# Patient Record
Sex: MALE | Race: WHITE | NOT HISPANIC OR LATINO | Employment: OTHER | URBAN - METROPOLITAN AREA
[De-identification: names, ages, dates, MRNs, and addresses within clinical notes are randomized per-mention and may not be internally consistent; named-entity substitution may affect disease eponyms.]

---

## 2023-03-08 ENCOUNTER — TELEPHONE (OUTPATIENT)
Dept: PRIMARY CARE | Facility: CLINIC | Age: 68
End: 2023-03-08
Payer: MEDICARE

## 2023-06-25 DIAGNOSIS — I10 PRIMARY HYPERTENSION: ICD-10-CM

## 2023-06-26 RX ORDER — AMLODIPINE BESYLATE 10 MG/1
10 TABLET ORAL DAILY
Qty: 90 TABLET | Refills: 1 | Status: SHIPPED | OUTPATIENT
Start: 2023-06-26 | End: 2023-12-17

## 2023-07-03 ENCOUNTER — HOSPITAL ENCOUNTER (OUTPATIENT)
Dept: DATA CONVERSION | Facility: HOSPITAL | Age: 68
End: 2023-07-03
Attending: SURGERY | Admitting: SURGERY
Payer: MEDICARE

## 2023-07-03 DIAGNOSIS — K40.90 UNILATERAL INGUINAL HERNIA, WITHOUT OBSTRUCTION OR GANGRENE, NOT SPECIFIED AS RECURRENT: ICD-10-CM

## 2023-07-03 DIAGNOSIS — Z87.891 PERSONAL HISTORY OF NICOTINE DEPENDENCE: ICD-10-CM

## 2023-07-27 ENCOUNTER — TELEPHONE (OUTPATIENT)
Dept: PRIMARY CARE | Facility: CLINIC | Age: 68
End: 2023-07-27
Payer: MEDICARE

## 2023-07-27 NOTE — TELEPHONE ENCOUNTER
Patient say he and his wife was referred to a Iain kapoor from Dr Abimael Lafleur to get updated vaccines for out of country travel. He want to know is iain a good person to go

## 2023-09-12 ENCOUNTER — OFFICE VISIT (OUTPATIENT)
Dept: PRIMARY CARE | Facility: CLINIC | Age: 68
End: 2023-09-12
Payer: MEDICARE

## 2023-09-12 VITALS — HEART RATE: 97 BPM | OXYGEN SATURATION: 96 %

## 2023-09-12 DIAGNOSIS — R05.3 CHRONIC COUGH: Primary | ICD-10-CM

## 2023-09-12 PROCEDURE — 99214 OFFICE O/P EST MOD 30 MIN: CPT | Performed by: INTERNAL MEDICINE

## 2023-09-12 PROCEDURE — 1126F AMNT PAIN NOTED NONE PRSNT: CPT | Performed by: INTERNAL MEDICINE

## 2023-09-12 RX ORDER — AMOXICILLIN AND CLAVULANATE POTASSIUM 875; 125 MG/1; MG/1
875 TABLET, FILM COATED ORAL 2 TIMES DAILY
Qty: 14 TABLET | Refills: 0 | Status: SHIPPED | OUTPATIENT
Start: 2023-09-12 | End: 2023-09-19

## 2023-09-12 RX ORDER — PREDNISONE 20 MG/1
TABLET ORAL
Qty: 14 TABLET | Refills: 0 | Status: SHIPPED | OUTPATIENT
Start: 2023-09-12 | End: 2023-12-19 | Stop reason: ALTCHOICE

## 2023-09-12 RX ORDER — FLUTICASONE PROPIONATE AND SALMETEROL 500; 50 UG/1; UG/1
POWDER RESPIRATORY (INHALATION)
Qty: 60 EACH | Refills: 11 | Status: SHIPPED | OUTPATIENT
Start: 2023-09-12

## 2023-09-12 NOTE — PROGRESS NOTES
"Eric Alexander is a 67 yo M presenting for sick visit.     *Around 8-12-23 developed URI sxs with cough, dyspnea. Tried OTC meds like Mucinex & Robitussin. +Assoc cervcalgia which was pronounced. Seen in Urgent Care 8.23.23 given doxy and pred x5 days without benefit excpet for the 5 days duration. Notes persistent low energy, shortness of breath, +cough +sputum       *Foot pain, left foot, x2-3 days. After twisting injury. +mild lateral aspect swelling and lateral aspect pain. No erythema or warmth. No malleolar TTP. Able to bear weight.     Chronic PL:   1. DLD - atorva started 12.22   2. H/o vertigo s/p MRI.MRA 4.2021   3. HTN - amlo 10, lisin 10 started 12.22  4. Inguinal hernia s/p repair  5. PreDM 6.0 in 1.23   6. PSA 4.1 in 1.23   7. Lumbar radic s/p remote spine surg    #HM   -repeat labs incl PSA   -AAA screen 2022   -cscope 12.2019   -tdap 2015   -shingrix x2   -pneumovax UTD         70\"   215 lb      Gen alert non toxic appearing   HENT mmm pharynx clear   CV rrr   Pulm diffuse expiratory wheeze, no adventitious sounds      A/P   Eric presents with likely persistent RAD post viral upper respiratory infection vs CAP.     Cough - obtain CXR r/o CAP. Start Augmentin (add azithrom if infiltrate). Repeat prednisone, 40 mg x 7 days. Start LABA-ICS inhaler and continue until cough fully resolved.   Foot pain - rec'ed supportive care. No imaging today given ability to bear weight and no malleolar TTP. Call back if worsens     RTO 11.2023 for CPE. Repeat PSA at that time along with routine labs  "

## 2023-09-12 NOTE — PATIENT INSTRUCTIONS
Eric,     Take Augmentin (antibiotic) 1 tab twice a day for 7 days.   Take prednisone 20 mg 2 tabs every morning for 7 days.   Start fluticasone-salmeterol inhaler 1 puff twice a day EVERY day until you are FULLY Better (could take for a month or longer)   Do Chest x-ray today   Message me if not better in a week

## 2023-09-29 VITALS
WEIGHT: 216.49 LBS | HEART RATE: 68 BPM | DIASTOLIC BLOOD PRESSURE: 63 MMHG | RESPIRATION RATE: 16 BRPM | SYSTOLIC BLOOD PRESSURE: 133 MMHG | BODY MASS INDEX: 30.99 KG/M2 | TEMPERATURE: 96.8 F | HEIGHT: 70 IN

## 2023-09-30 NOTE — H&P
History of Present Illness:   History Present Illness:  Reason for surgery: R. Inguinal Hernia Repair   HPI:    HPI: Patient presented in April 2023 to Dr. Fry with R. inguinal hernia for the past year. Patient states that it worsens with swimming and sneezing; affecting  his day to day living.    MPH: HTN, enlarged prostate, HLD    PSH: lumbar laminectomy, c-scope, cataract surgery, tonsillectomy    Meds: albuterol, amlodipine, atorvastatin, lisinopril, proAir    Allergies: NKDA    FH:   - Father: ruptured AAA (age 64)  - Mother: cholangiocarcinoma (age: 82)  - chromosome 3p deletion  Grandparents: stomach CA, COPD, MI, multiple myeloma    SH: former smoker, social EtOH use, quit tobacco in 2000    ROS: the remainder of the 12 point ROS was negative except as stated in the HPI    Allergies:        Allergies:  ·  No Known Allergies :     Home Medication Review:   Home Medications Reviewed: yes     Impression/Procedure:   ·  Impression and Planned Procedure: R. inguinal hernia repair       ERAS (Enhanced Recovery After Surgery):  ·  ERAS Patient: no       Vital Signs:  Temperature C: 36 degrees C   Temperature F: 96.8 degrees F   Heart Rate: 68 beats per minute   Respiratory Rate: 16 breath per minute   Blood Pressure Systolic: 133 mm/Hg   Blood Pressure Diastolic: 63 mm/Hg     Physical Exam by System:    Constitutional: Well developed, awake/alert/oriented  x3, no distress, alert and cooperative   Eyes: EOMI, sclera anicteric   ENMT: Trachea midline   Head/Neck: NC/AT   Respiratory/Thorax: Equal chest rise, nonlabored  breathing on RA   Cardiovascular: RRR   Gastrointestinal: s/nd/nt   Genitourinary: No protruding hernia at baseline   Musculoskeletal: MAEE   Neurological: alert and oriented x3   Psychological: appropriate mood and affect   Skin: Warm and dry     Consent:   COVID-19 Consent:  ·  COVID-19 Risk Consent Surgeon has reviewed key risks related to the risk of bear COVID-19 and if they  contract COVID-19 what the risks are.     Attestation:   Note Completion:  I am a:  Resident/Fellow   Attending Attestation I saw and evaluated the patient.  I personally obtained the key and critical portions of the history and physical exam or was physically present for key and  critical portions performed by the resident/fellow. I reviewed the resident/fellow?s documentation and discussed the patient with the resident/fellow.  I agree with the resident/fellow?s medical decision making as documented in the note.     I personally evaluated the patient on 03-Jul-2023         Electronic Signatures:  Tone Fry)  (Signed 03-Jul-2023 06:50)   Authored: Note Completion   Co-Signer: History of Present Illness, Allergies, Home Medication Review, Impression/Procedure, ERAS, Physical Exam, Consent, Note Completion  Yadira Garza (Resident))  (Signed 03-Jul-2023 06:50)   Authored: History of Present Illness, Allergies, Home  Medication Review, Impression/Procedure, ERAS, Physical Exam, Consent, Note Completion      Last Updated: 03-Jul-2023 06:50 by Tone Fry)

## 2023-10-02 NOTE — OP NOTE
PROCEDURE DETAILS    Preoperative Diagnosis:  R. inguinal hernia   Postoperative Diagnosis:  B/l inguinal hernias (left, direct; right, indirect)  Surgeon: Tone Fry MD  Resident/Fellow/Other Assistant: MD JORDY Palmubin Grossman, MS4    Procedure:  B/l inguinal hernia repair with mesh placement (Left direct, right indirect)  Estimated Blood Loss: 2cc  Blood Replaced: None  Findings: R. Indirect inguinal hernia; L. direct inguinal hernia  Specimens(s) Collected: no,     Complications: None  Drains and/or Catheters: None  Additional Details: - D/C home from PACU after patient voids  - Percocet, 4 tab PRN for pain management  - Can take tylenol/advil OTC for additional pain needs      Patient Returned To/Condition: PACU/Stable                                Attestation:   Note Completion:  Attending Attestation I was present for the entire procedure    I am a: Resident/Fellow         Electronic Signatures:  Tone Fry)  (Signed 03-Jul-2023 08:29)   Authored: Note Completion   Co-Signer: Post-Operative Note, Chart Review, Note Completion  Yadira Garza (Resident))  (Signed 03-Jul-2023 08:13)   Authored: Post-Operative Note, Chart Review, Note Completion      Last Updated: 03-Jul-2023 08:29 by Tone Fry)

## 2023-12-17 DIAGNOSIS — I10 PRIMARY HYPERTENSION: ICD-10-CM

## 2023-12-17 DIAGNOSIS — R73.9 HYPERGLYCEMIA, UNSPECIFIED: ICD-10-CM

## 2023-12-17 RX ORDER — AMLODIPINE BESYLATE 10 MG/1
10 TABLET ORAL DAILY
Qty: 90 TABLET | Refills: 0 | Status: SHIPPED | OUTPATIENT
Start: 2023-12-17 | End: 2024-03-25 | Stop reason: SDUPTHER

## 2023-12-17 RX ORDER — ATORVASTATIN CALCIUM 20 MG/1
20 TABLET, FILM COATED ORAL DAILY
Qty: 90 TABLET | Refills: 0 | Status: SHIPPED | OUTPATIENT
Start: 2023-12-17 | End: 2024-03-25 | Stop reason: SDUPTHER

## 2023-12-17 RX ORDER — LISINOPRIL 10 MG/1
10 TABLET ORAL DAILY
Qty: 90 TABLET | Refills: 0 | Status: SHIPPED | OUTPATIENT
Start: 2023-12-17 | End: 2024-03-25 | Stop reason: SDUPTHER

## 2023-12-19 ENCOUNTER — CLINICAL SUPPORT (OUTPATIENT)
Dept: INFECTIOUS DISEASES | Facility: CLINIC | Age: 68
End: 2023-12-19
Payer: MEDICARE

## 2023-12-19 DIAGNOSIS — Z71.84 COUNSELING FOR TRAVEL: Primary | ICD-10-CM

## 2023-12-19 PROCEDURE — 99202U03 TRAVEL CONSULT (U03): Performed by: INTERNAL MEDICINE

## 2023-12-19 PROCEDURE — 90690 TYPHOID VACCINE ORAL: CPT | Performed by: INTERNAL MEDICINE

## 2023-12-19 RX ORDER — AZITHROMYCIN 600 MG/1
600 TABLET, FILM COATED ORAL DAILY
Qty: 3 TABLET | Refills: 0 | Status: SHIPPED | OUTPATIENT
Start: 2023-12-19 | End: 2023-12-22

## 2023-12-19 RX ORDER — TRIAMCINOLONE ACETONIDE 55 UG/1
2 SPRAY, METERED NASAL AS NEEDED
COMMUNITY

## 2023-12-19 NOTE — LETTER
12/19/23    Kelly Bautista MD  [address]  [facility], [address]  [facility] [address]      Dear Dr. Kelly Bautista MD,    I am writing to confirm that your patient, Eric Alexander, received care in my office on 12/19/23. I have enclosed a summary of the care provided to Eric for your reference.    Please contact me with any questions you may have regarding the visit.    Sincerely,         DO [id number], TRAVEL CLINIC PROVIDER  [address] [facility] [address]  [place] [address]    CC: No Recipients

## 2023-12-19 NOTE — PROGRESS NOTES
Going to [facility], [place], [facility], , [place] and [place] for 12 days in Mar 2024    PLAN:  --Oral typhoid  --Azithro prn  --Discussed standard travel precautions

## 2023-12-22 ENCOUNTER — APPOINTMENT (OUTPATIENT)
Dept: PRIMARY CARE | Facility: CLINIC | Age: 68
End: 2023-12-22
Payer: MEDICARE

## 2024-03-25 DIAGNOSIS — I10 PRIMARY HYPERTENSION: ICD-10-CM

## 2024-03-25 DIAGNOSIS — R73.9 HYPERGLYCEMIA, UNSPECIFIED: ICD-10-CM

## 2024-03-26 RX ORDER — LISINOPRIL 10 MG/1
10 TABLET ORAL DAILY
Qty: 90 TABLET | Refills: 1 | Status: SHIPPED | OUTPATIENT
Start: 2024-03-26

## 2024-03-26 RX ORDER — ATORVASTATIN CALCIUM 20 MG/1
20 TABLET, FILM COATED ORAL DAILY
Qty: 90 TABLET | Refills: 1 | Status: SHIPPED | OUTPATIENT
Start: 2024-03-26

## 2024-03-26 RX ORDER — AMLODIPINE BESYLATE 10 MG/1
10 TABLET ORAL DAILY
Qty: 90 TABLET | Refills: 1 | Status: SHIPPED | OUTPATIENT
Start: 2024-03-26

## 2024-05-06 NOTE — OP NOTE
PREOPERATIVE DIAGNOSIS:  Right inguinal hernia.    POSTOPERATIVE DIAGNOSIS:  Bilateral inguinal hernias.    OPERATION/PROCEDURE:  Laparoscopic preperitoneal bilateral inguinal hernia repairs with  mesh.     SURGEON:  Tone Fry MD.    ASSISTANT(S):  Dr. Yadira Garza.    ANESTHESIA:    CLINICAL NOTE:  This is a patient with a symptomatic right inguinal hernia.  It has  been getting larger.  He understands risks and benefits of surgery,  possible repair of both hernias if we see one on the other side and  consents.     DESCRIPTION OF OPERATION:  The patient was brought to the operating room.  General endotracheal  anesthesia was performed.  The patient's abdomen was prepped and  draped in usual fashion.  Using vertical umbilical incision, the  anterior rectus fascia was opened, rectus muscle retracted, a balloon  dissector placed on the pubic tubercle.  I blew this up under direct  visualization.  We removed this, placed my Shantel balloon in,  insufflated the properitoneal space to a pressure of 15. Placed two 5  mm trocars in midline.  Began dissecting on the left side.  As soon  as we looked on the left, he had a direct inguinal hernia, it was  easily visualized.  This was reduced, cord structures were  peritonealized.  A 3 x 5.5 piece of mesh was used to cover the entire  floor of the inguinal canal.  It was fixated at Aron's ligament  with AbsorbaTack inferiorly, superiorly above the iliopubic tract.  I  turned my attention to the right side.  On the right side, he also  had a large hernia, this was reduced.  It was actually anterior to  the cord structures, but lateral to the inferior epigastric.  We  reduced this entire hernia, peritonealized the cord structures.  I  again placed a 3 x 5.5 piece of mesh to cover this entire floor of  the inguinal canal quite well, fixated with AbsorbaTack in 3 spots.  I then slowly desufflated properitoneal space.  Peritoneum came on  top of the mesh.  I  removed my trocars, closed the fascial defect at  the umbilicus with 0 Vicryl and skin incisions with 4-0 Vicryl.       Tone Fry MD    DD:  07/03/2023 08:13:37 EST  DT:  07/03/2023 15:00:18 EST  DICTATION NUMBER:  [id number]  INTERNAL JOB NUMBER:  [id number]    CC:  Tone Fry MD, Fax: [phone]  Dr. Kelly Saldivar        Electronic Signatures:  Tone Fry) (Signed on 04-Jul-2023 09:38)   Authored  Unsigned, Draft (SYS GENERATED) (Entered on 03-Jul-2023 15:16)   Entered  Unsigned, Draft (SYS GENERATED) (Entered on 03-Jul-2023 15:00)   Entered    Last Updated: 04-Jul-2023 09:38 by Tone Fry)

## 2024-06-10 ENCOUNTER — APPOINTMENT (OUTPATIENT)
Dept: PRIMARY CARE | Facility: CLINIC | Age: 69
End: 2024-06-10
Payer: MEDICARE

## 2024-06-23 ENCOUNTER — APPOINTMENT (OUTPATIENT)
Dept: RADIOLOGY | Facility: HOSPITAL | Age: 69
End: 2024-06-23
Payer: MEDICARE

## 2024-06-23 ENCOUNTER — APPOINTMENT (OUTPATIENT)
Dept: CARDIOLOGY | Facility: HOSPITAL | Age: 69
End: 2024-06-23
Payer: MEDICARE

## 2024-06-23 ENCOUNTER — HOSPITAL ENCOUNTER (EMERGENCY)
Facility: HOSPITAL | Age: 69
Discharge: HOME | End: 2024-06-23
Attending: EMERGENCY MEDICINE
Payer: MEDICARE

## 2024-06-23 VITALS
TEMPERATURE: 98.1 F | DIASTOLIC BLOOD PRESSURE: 74 MMHG | HEIGHT: 69 IN | RESPIRATION RATE: 20 BRPM | OXYGEN SATURATION: 97 % | HEART RATE: 60 BPM | SYSTOLIC BLOOD PRESSURE: 122 MMHG | WEIGHT: 213 LBS | BODY MASS INDEX: 31.55 KG/M2

## 2024-06-23 DIAGNOSIS — R07.89 ATYPICAL CHEST PAIN: Primary | ICD-10-CM

## 2024-06-23 LAB
ALBUMIN SERPL BCP-MCNC: 4.4 G/DL (ref 3.4–5)
ALP SERPL-CCNC: 97 U/L (ref 33–136)
ALT SERPL W P-5'-P-CCNC: 32 U/L (ref 10–52)
ANION GAP SERPL CALC-SCNC: 11 MMOL/L (ref 10–20)
AST SERPL W P-5'-P-CCNC: 30 U/L (ref 9–39)
BASOPHILS # BLD AUTO: 0.03 X10*3/UL (ref 0–0.1)
BASOPHILS NFR BLD AUTO: 0.5 %
BILIRUB DIRECT SERPL-MCNC: 0.1 MG/DL (ref 0–0.3)
BILIRUB SERPL-MCNC: 0.5 MG/DL (ref 0–1.2)
BUN SERPL-MCNC: 21 MG/DL (ref 6–23)
CALCIUM SERPL-MCNC: 9.2 MG/DL (ref 8.6–10.3)
CARDIAC TROPONIN I PNL SERPL HS: 3 NG/L (ref 0–20)
CARDIAC TROPONIN I PNL SERPL HS: <3 NG/L (ref 0–20)
CHLORIDE SERPL-SCNC: 104 MMOL/L (ref 98–107)
CO2 SERPL-SCNC: 26 MMOL/L (ref 21–32)
CREAT SERPL-MCNC: 0.81 MG/DL (ref 0.5–1.3)
CRP SERPL-MCNC: 0.14 MG/DL
EGFRCR SERPLBLD CKD-EPI 2021: >90 ML/MIN/1.73M*2
EOSINOPHIL # BLD AUTO: 0.14 X10*3/UL (ref 0–0.7)
EOSINOPHIL NFR BLD AUTO: 2.2 %
ERYTHROCYTE [DISTWIDTH] IN BLOOD BY AUTOMATED COUNT: 13.6 % (ref 11.5–14.5)
ERYTHROCYTE [SEDIMENTATION RATE] IN BLOOD BY WESTERGREN METHOD: 12 MM/H (ref 0–20)
GLUCOSE SERPL-MCNC: 111 MG/DL (ref 74–99)
HCT VFR BLD AUTO: 44.1 % (ref 41–52)
HGB BLD-MCNC: 15 G/DL (ref 13.5–17.5)
IMM GRANULOCYTES # BLD AUTO: 0.01 X10*3/UL (ref 0–0.7)
IMM GRANULOCYTES NFR BLD AUTO: 0.2 % (ref 0–0.9)
LIPASE SERPL-CCNC: 31 U/L (ref 9–82)
LYMPHOCYTES # BLD AUTO: 1.8 X10*3/UL (ref 1.2–4.8)
LYMPHOCYTES NFR BLD AUTO: 27.9 %
MCH RBC QN AUTO: 30.7 PG (ref 26–34)
MCHC RBC AUTO-ENTMCNC: 34 G/DL (ref 32–36)
MCV RBC AUTO: 90 FL (ref 80–100)
MONOCYTES # BLD AUTO: 0.51 X10*3/UL (ref 0.1–1)
MONOCYTES NFR BLD AUTO: 7.9 %
NEUTROPHILS # BLD AUTO: 3.97 X10*3/UL (ref 1.2–7.7)
NEUTROPHILS NFR BLD AUTO: 61.3 %
NRBC BLD-RTO: 0 /100 WBCS (ref 0–0)
PLATELET # BLD AUTO: 259 X10*3/UL (ref 150–450)
POTASSIUM SERPL-SCNC: 4.4 MMOL/L (ref 3.5–5.3)
PROT SERPL-MCNC: 6.7 G/DL (ref 6.4–8.2)
RBC # BLD AUTO: 4.88 X10*6/UL (ref 4.5–5.9)
SODIUM SERPL-SCNC: 137 MMOL/L (ref 136–145)
WBC # BLD AUTO: 6.5 X10*3/UL (ref 4.4–11.3)

## 2024-06-23 PROCEDURE — 85025 COMPLETE CBC W/AUTO DIFF WBC: CPT | Performed by: EMERGENCY MEDICINE

## 2024-06-23 PROCEDURE — 86140 C-REACTIVE PROTEIN: CPT | Performed by: EMERGENCY MEDICINE

## 2024-06-23 PROCEDURE — 36415 COLL VENOUS BLD VENIPUNCTURE: CPT | Performed by: EMERGENCY MEDICINE

## 2024-06-23 PROCEDURE — 99284 EMERGENCY DEPT VISIT MOD MDM: CPT | Mod: 25

## 2024-06-23 PROCEDURE — C9113 INJ PANTOPRAZOLE SODIUM, VIA: HCPCS | Performed by: EMERGENCY MEDICINE

## 2024-06-23 PROCEDURE — 93005 ELECTROCARDIOGRAM TRACING: CPT

## 2024-06-23 PROCEDURE — 85652 RBC SED RATE AUTOMATED: CPT | Performed by: EMERGENCY MEDICINE

## 2024-06-23 PROCEDURE — 71045 X-RAY EXAM CHEST 1 VIEW: CPT

## 2024-06-23 PROCEDURE — 96374 THER/PROPH/DIAG INJ IV PUSH: CPT

## 2024-06-23 PROCEDURE — 82248 BILIRUBIN DIRECT: CPT | Performed by: EMERGENCY MEDICINE

## 2024-06-23 PROCEDURE — 71045 X-RAY EXAM CHEST 1 VIEW: CPT | Performed by: RADIOLOGY

## 2024-06-23 PROCEDURE — 2500000004 HC RX 250 GENERAL PHARMACY W/ HCPCS (ALT 636 FOR OP/ED): Performed by: EMERGENCY MEDICINE

## 2024-06-23 PROCEDURE — 83690 ASSAY OF LIPASE: CPT | Performed by: EMERGENCY MEDICINE

## 2024-06-23 PROCEDURE — 84484 ASSAY OF TROPONIN QUANT: CPT | Mod: 91 | Performed by: EMERGENCY MEDICINE

## 2024-06-23 PROCEDURE — 84484 ASSAY OF TROPONIN QUANT: CPT | Performed by: EMERGENCY MEDICINE

## 2024-06-23 RX ORDER — PANTOPRAZOLE SODIUM 40 MG/10ML
40 INJECTION, POWDER, LYOPHILIZED, FOR SOLUTION INTRAVENOUS ONCE
Status: COMPLETED | OUTPATIENT
Start: 2024-06-23 | End: 2024-06-23

## 2024-06-23 ASSESSMENT — HEART SCORE
AGE: 65+
TROPONIN: LESS THAN OR EQUAL TO NORMAL LIMIT
HISTORY: SLIGHTLY SUSPICIOUS
ECG: NORMAL
HEART SCORE: 3
RISK FACTORS: 1-2 RISK FACTORS

## 2024-06-23 ASSESSMENT — PAIN SCALES - GENERAL
PAINLEVEL_OUTOF10: 5 - MODERATE PAIN
PAINLEVEL_OUTOF10: 2

## 2024-06-23 ASSESSMENT — PAIN - FUNCTIONAL ASSESSMENT: PAIN_FUNCTIONAL_ASSESSMENT: 0-10

## 2024-06-23 ASSESSMENT — PAIN DESCRIPTION - ONSET: ONSET: ONGOING

## 2024-06-23 ASSESSMENT — PAIN DESCRIPTION - PROGRESSION: CLINICAL_PROGRESSION: GRADUALLY WORSENING

## 2024-06-23 ASSESSMENT — PAIN DESCRIPTION - DESCRIPTORS: DESCRIPTORS: PRESSURE

## 2024-06-23 ASSESSMENT — COLUMBIA-SUICIDE SEVERITY RATING SCALE - C-SSRS
1. IN THE PAST MONTH, HAVE YOU WISHED YOU WERE DEAD OR WISHED YOU COULD GO TO SLEEP AND NOT WAKE UP?: NO
2. HAVE YOU ACTUALLY HAD ANY THOUGHTS OF KILLING YOURSELF?: NO
6. HAVE YOU EVER DONE ANYTHING, STARTED TO DO ANYTHING, OR PREPARED TO DO ANYTHING TO END YOUR LIFE?: NO

## 2024-06-23 ASSESSMENT — PAIN DESCRIPTION - ORIENTATION: ORIENTATION: MID

## 2024-06-23 ASSESSMENT — PAIN DESCRIPTION - LOCATION: LOCATION: CHEST

## 2024-06-23 ASSESSMENT — PAIN DESCRIPTION - FREQUENCY: FREQUENCY: CONSTANT/CONTINUOUS

## 2024-06-23 NOTE — ED NOTES
Pt reports chest pain for about 1 week but complains pain has increased and is constant. Pt connected to cardiac monitoring, cycling bp and continuous spo2 monitoring.     Savage Han RN  06/23/24 0806

## 2024-06-23 NOTE — ED TRIAGE NOTES
Pt states that he developed chest pain a week ago. Pt states that today his chest pain has increased. Pt states that the pain is non radiating mid sternal. Pt denies any shortness of breath

## 2024-06-24 NOTE — ED PROVIDER NOTES
HPI   Chief Complaint   Patient presents with    Chest Pain       HPI: []  68-year-old white male with a history of hypertension, dyslipidemia comes in with chest pain.  History for the last about a week he has ongoing nonstop lower sternal pain.  Pain is constant.  Does not radiate.  He has no associated diaphoresis shortness of breath syncope or near syncope.  He has no trouble with exertion.  He is able to swim about a mile and a half no problems able to walk about 7 miles no problem.  Pain does get better sometimes when he lays flat at night.  Able to sleep at night.  Does not get worse when leans forward.  He has no abdominal pain nausea diarrhea fever chills cough congestion incontinence seizures syncope anoscopy no hematemesis melena medic easy no hemoptysis no recent travel hospitalization or antibiotic use.  Negative cardiac score calcium about 13 years ago which was 0.    Past history: Hypertension, dyslipidemia  Social: Ex tobacco use he smoked about 25 years quit when he was in the mid 40s.  Started age 18.  Social drinker no drug use.  REVIEW OF SYSTEMS:    GENERAL.: No weight loss, fatigue, anorexia, insomnia, fever.    EYES: No vision loss, double vision, drainage, eye pain.    ENT: No pharyngitis, dry mouth.    CARDIOPULMONARY: Positive for chest pain, palpitations, syncope, near syncope. No shortness of breath, cough, hemoptysis.    GI: No abdominal pain, change in bowel habits, melena, hematemesis, hematochezia, nausea, vomiting, diarrhea.    : No discharge, dysuria, frequency, urgency, hematuria.    MS: No limb pain, joint pain, joint swelling.    SKIN: No rashes.    PSYCH: No depression, anxiety, suicidality, homicidality.    Review of systems is otherwise negative unless stated above or in history of present illness.  Social history, family history, allergies reviewed.  PHYSICAL EXAM:    GENERAL: Vitals noted, no distress. Alert and oriented  x 3. Non-toxic.      EENT: TMs clear. Posterior  oropharynx unremarkable. No meningismus. No LAD.     NECK: Supple. Nontender. No midline tenderness.     CARDIAC: Regular, rate, rhythm. No murmurs rubs or gallops. No JVD    PULMONARY: Lungs clear bilaterally with good aeration. No wheezes rales or rhonchi. No respiratory distress.     ABDOMEN: Soft, nonsurgical. Nontender. No peritoneal signs. Normoactive bowel sounds. No pulsatile masses.     EXTREMITIES: No peripheral edema. Negative Homans bilaterally, no cords.  2+ bounding pulses well-perfused    SKIN: No rash. Intact.     NEURO: No focal neurologic deficits, NIH score of 0. Cranial nerves normal as tested from II through XII.     MEDICAL DECISION MAKING:  EKG on my interpretation shows a normal sinus rhythm normal axis rate mid 60s 60s with no ischemic changes.  CBC with chemistries LFTs are normal troponin x 2 is negative chest x-ray negative.  ESR CRP normal.    Treatment injury: IV established external cardiac monitor given IV Protonix    ED course: Patient remains normotensive no tachycardia or hypoxia.    Impression: Atypical chest pain    Plans and MDM: 68-year-old white male with history of hypertension dyslipidemia remote tobacco use comes in with ongoing nonstop lower sternal pain for about 1 weeks duration pain is constant nonradiating no other associated symptoms no diaphoresis no syncope or near syncope, no trouble with exertion, able to swim about a mile and a half without problems able to walk about 5 to 7 miles without any problems, my suspicion for unstable angina ACS or dissection pulm embolism low.  Given ongoing symptoms for 6 days duration I would expect to see some EKG changes some elevated troponins and or some other red flags in the history examination which are not there patient's pain did does not radiate no trouble breathing so my suspicion for pulmonary embolism or dissection is low.  Patient be discharged home advised abortive care advised a PPI advised outpatient stress test with  strict return precaution.                          Sarah Coma Scale Score: 15                     Patient History   Past Medical History:   Diagnosis Date    Allergic rhinitis, unspecified 2015    Allergic rhinitis    Benign lipomatous neoplasm, unspecified 2015    Lipoma    Cough variant asthma (HHS-HCC) 2015    Cough variant asthma    Elevated prostate specific antigen (PSA) 2020    Elevated prostate specific antigen (PSA)    Hyperlipidemia, unspecified 2022    Hyperlipemia    Personal history of other endocrine, nutritional and metabolic disease 2015    History of obesity    Unspecified thoracic, thoracolumbar and lumbosacral intervertebral disc disorder     Lumbar disc disease     Past Surgical History:   Procedure Laterality Date    CATARACT EXTRACTION  2014    Cataract Surgery    CATARACT EXTRACTION  2018    Cataract Extraction    MR HEAD ANGIO WO IV CONTRAST  2021    MR HEAD ANGIO WO IV CONTRAST 2021 [facility]    MR NECK ANGIO WO IV CONTRAST  2021    MR NECK ANGIO WO IV CONTRAST 2021 [facility]    SPINAL FUSION  2018    Spinal Arthrodesis    TONSILLECTOMY  2018    Tonsillectomy     No family history on file.  Social History     Tobacco Use    Smoking status: Former     Current packs/day: 0.00     Types: Cigarettes     Quit date: 2000     Years since quittin.4     Passive exposure: Past    Smokeless tobacco: Never   Substance Use Topics    Alcohol use: Not on file    Drug use: Not on file       Physical Exam   ED Triage Vitals [24 1744]   Temperature Heart Rate Respirations BP   36.7 °C (98.1 °F) 68 18 163/71      Pulse Ox Temp Source Heart Rate Source Patient Position   98 % Tympanic Monitor Sitting      BP Location FiO2 (%)     Left arm --       Physical Exam    ED Course & [facility]   ED Course as of 24   Sun  Patient EKG on my interpretation shows normal sinus rhythm normal  axis rate mid 60s with no ischemic changes.  CBC with differential chemistry LFTs are normal troponin x 2 is negative ESR CRP normal chest x-ray negative.  Patient is symptoms are very atypical my concern for STEMI NSTEMI ACS dissection embolism or any other catastrophic pathology low.  Suspicion for pericarditis is low.  Patient be discharged home advised supportive care outpatient cardiac stress test with strict return precaution. [MT]      ED Course User Index  [MT] Radha Hughes MD         Diagnoses as of 06/23/24 2139   Atypical chest pain       Medical Decision Making      Procedure  Procedures     Radha Hughes MD  06/23/24 2141

## 2024-06-25 LAB
ATRIAL RATE: 67 BPM
P AXIS: 57 DEGREES
P OFFSET: 197 MS
P ONSET: 142 MS
PR INTERVAL: 148 MS
Q ONSET: 216 MS
QRS COUNT: 11 BEATS
QRS DURATION: 96 MS
QT INTERVAL: 386 MS
QTC CALCULATION(BAZETT): 407 MS
QTC FREDERICIA: 400 MS
R AXIS: 60 DEGREES
T AXIS: 69 DEGREES
T OFFSET: 409 MS
VENTRICULAR RATE: 67 BPM

## 2024-07-02 ENCOUNTER — APPOINTMENT (OUTPATIENT)
Dept: PRIMARY CARE | Facility: CLINIC | Age: 69
End: 2024-07-02
Payer: MEDICARE

## 2024-07-02 VITALS
WEIGHT: 210 LBS | BODY MASS INDEX: 31.01 KG/M2 | DIASTOLIC BLOOD PRESSURE: 80 MMHG | SYSTOLIC BLOOD PRESSURE: 152 MMHG | HEART RATE: 69 BPM | OXYGEN SATURATION: 97 %

## 2024-07-02 DIAGNOSIS — R07.89 OTHER CHEST PAIN: Primary | ICD-10-CM

## 2024-07-02 PROCEDURE — 99213 OFFICE O/P EST LOW 20 MIN: CPT | Performed by: STUDENT IN AN ORGANIZED HEALTH CARE EDUCATION/TRAINING PROGRAM

## 2024-07-02 NOTE — PROGRESS NOTES
Subjective   Patient ID: Eric Alexander is a 68 y.o. male with HTN and DLD who presents for Follow-up (ER follow up Chest pain). Former patient of Dr. Quintana.     HPI  Seen in ED   History for the last about a week he has ongoing nonstop lower sternal pain   Felt like toddler was sitting on chest  Normal EKG and troponin    Reviewed     Only time he has had some chest pressure since then was while having sex, self resolved     Sometimes gets reflux, but not bad     Retired CPA   Walks 5-7 miles a day  Swims  [employer]  No issues with exercise   Was in [employer]  Quit smoking     Was referred to Rusty Wayne from ED     Family history:  Mother:  at 82, cholangiocarcinoma; Father:  at 64, AAA;  stomach CA, COPD, MI, Multiple Myeloma; 2 children (dtr, nurse in [facility]; son)      Objective   Visit Vitals  /80   Pulse 69   Wt 95.3 kg (210 lb)   SpO2 97%   BMI 31.01 kg/m²   Smoking Status Former   BSA 2.15 m²      Physical Exam  General: Well appearing, conversational, in no acute distress  HEENT: EOMI, PERRL, nares patent without congestion, MMM  CV: RRR, no murmurs  Resp: Lungs CTAB, normal work of breathing  GI: Soft, nondistended, nontender, BS+   Ext: No lower ext swelling  Skin: Warm, dry, no rashes  Neuro: Awake, alert, oriented x3, moving all 4 extremities, nonfocal, normal gait, ambulates without assistance  Psych: Appropriate mood and affect      Assessment/Plan   Eric Alexander is a 68 y.o. male with HTN and DLD who presents for Follow-up (ER follow up Chest pain). Former patient of Dr. Quintana. Overall, I would not be too concerned as work-up in ED was reassuring and able to exercise. However, he endorsed some chest pain with intercourse which is more concerning. I have ordered a stress test and discussed return to ED precautions.     Problem List Items Addressed This Visit    None  Visit Diagnoses       Other chest pain    -  Primary    Relevant Orders    Echocardiogram Stress Test                  Yulisa Gross MD MPH

## 2024-07-02 NOTE — PATIENT INSTRUCTIONS
Thank you for coming today Eric!    Please schedule your stress test and an appointment with Dr. Wayne to occur after your stress test.    You can call [phone] to schedule.    I will see you in September or sooner if needed!

## 2024-07-24 ENCOUNTER — HOSPITAL ENCOUNTER (OUTPATIENT)
Dept: CARDIOLOGY | Facility: HOSPITAL | Age: 69
Discharge: HOME | End: 2024-07-24
Payer: MEDICARE

## 2024-07-24 DIAGNOSIS — R07.89 OTHER CHEST PAIN: ICD-10-CM

## 2024-07-24 PROCEDURE — 93018 CV STRESS TEST I&R ONLY: CPT | Performed by: INTERNAL MEDICINE

## 2024-07-24 PROCEDURE — 93016 CV STRESS TEST SUPVJ ONLY: CPT | Performed by: INTERNAL MEDICINE

## 2024-07-24 PROCEDURE — 93017 CV STRESS TEST TRACING ONLY: CPT

## 2024-07-24 PROCEDURE — 93350 STRESS TTE ONLY: CPT | Performed by: INTERNAL MEDICINE

## 2024-08-27 NOTE — PROGRESS NOTES
"Primary Care Physician: Yulisa Gross MD MPH  Date of Visit: 2024 11:00 AM EDT      Chief Complaint:   Hospital ER follow up for chest pain    HPI / Summary:   Eric Alexander is a 69 y.o. male with dyslipidemia and HTN who presented to ER [facility] 2024 with constant chest pressure that had lasted several hours at least.  According to ER notes have been about 1 week.  He does not recall if it had lasted that long.  He described it as \"like a toddler sitting on my chest.\"  There were no exacerbating or alleviating factors.  His troponin was negative x 2 EKG normal sinus rhythm and normal.  It was felt low suspicion for any cardiac or pulmonary embolus and he was discharged with plan for outpatient stress test and follow-up.  He says about 2 days later after the ER visit the pain went away and has not returned since.  He is active exercising several days a week between biking walking and pickleball.  Echo cardiogram stress was completed 2024 and is normal as below.      Last Cardiology Tests:  EC24  NSR, normal ecg    Echo:   N/A  Cath:  N/A    Stress Test:  24  1. The resting ejection fraction was estimated at 60 to 65% with a peak exercise ejection fraction estimated at >75%.   2. Normal global left ventricular systolic function.   3. Adequate level of stress achieved.   4. No clinical, echocardiographic or electrocardiographic evidence for ischemia at a maximal workload.   5. Normal Stress Test.    Cardiac Imaging: ct scoring 13  Total coronary artery calcium score of 0 falls within the \" No    identifiable atherosclerotic plaque \" category.   Please note that up   to 5% of patients with a negative exam still have significant   noncalcified plaque, and therefore have a falsely \" negative \" CT   coronary calcium score exam.      Past Medical History:  Past Medical History:   Diagnosis Date    Allergic rhinitis, unspecified 2015    Allergic rhinitis    Benign lipomatous " neoplasm, unspecified 05/26/2015    Lipoma    Cough variant asthma (HHS-HCC) 05/26/2015    Cough variant asthma    Elevated prostate specific antigen (PSA) 08/21/2020    Elevated prostate specific antigen (PSA)    Hyperlipidemia, unspecified 12/21/2022    Hyperlipemia    Personal history of other endocrine, nutritional and metabolic disease 05/26/2015    History of obesity    Unspecified thoracic, thoracolumbar and lumbosacral intervertebral disc disorder     Lumbar disc disease        Past Surgical History:  Past Surgical History:   Procedure Laterality Date    CATARACT EXTRACTION  05/19/2014    Cataract Surgery    CATARACT EXTRACTION  05/02/2018    Cataract Extraction    MR HEAD ANGIO WO IV CONTRAST  4/5/2021    MR HEAD ANGIO WO IV CONTRAST 4/5/2021 [facility]    MR NECK ANGIO WO IV CONTRAST  4/5/2021    MR NECK ANGIO WO IV CONTRAST 4/5/2021 [facility]    SPINAL FUSION  05/02/2018    Spinal Arthrodesis    TONSILLECTOMY  05/02/2018    Tonsillectomy          Social History:  He reports that he quit smoking about 24 years ago. His smoking use included cigarettes. He has been exposed to tobacco smoke. He has never used smokeless tobacco. No history on file for alcohol use and drug use.    Family History:  family history is not on file.      Allergies:  No Known Allergies    Outpatient Medications:  Current Outpatient Medications   Medication Instructions    amLODIPine (NORVASC) 10 mg, oral, Daily    atorvastatin (LIPITOR) 20 mg, oral, Daily    fluticasone propion-salmeteroL (Advair Diskus) 500-50 mcg/dose diskus inhaler Take 1 puff twice a day until cough gone. Rinse mouth with water after use to reduce aftertaste and incidence of candidiasis. Do not swallow.    lisinopril 10 mg, oral, Daily    triamcinolone (Nasacort) 55 mcg nasal inhaler 2 sprays, Each Nostril, As needed       Review of Systems:  A complete 10 point ROS was performed and is negative except for HPI    Physical Exam:  GENERAL:  "alert, cooperative, pleasant, in no acute distress  SKIN: warm, dry, no rash.  NECK: no JVD, no FRANKLIN  CARDIAC: Regular rate and rhythm with no rubs, murmurs, or gallops  CHEST: Normal respiratory efforts, lungs clear to auscultation bilaterally.  ABDOMEN: soft, nontender, nondistended  EXTREMITIES: no edema  NEURO: Alert and oriented x 3.  Grossly normal.  Moves all 4 extremities.    Vitals:    08/28/24 1042   BP: 143/76   BP Location: Left arm   Pulse: 75   SpO2: 96%   Weight: 95.7 kg (211 lb)     Wt Readings from Last 5 Encounters:   07/02/24 95.3 kg (210 lb)   06/23/24 96.6 kg (213 lb)   07/27/23 97.7 kg (215 lb 5 oz)   04/06/23 97.5 kg (215 lb)   12/20/21 96.5 kg (212 lb 12.8 oz)     Body mass index is 31.16 kg/m².        Last Labs:  CMP:  Recent Labs     06/23/24  1819 01/25/23  0809 12/20/21  0832    140 144   K 4.4 4.6 5.2    102 107   CO2 26 31 29   ANIONGAP 11 12 13   BUN 21 18 21   CREATININE 0.81 0.81 0.71   EGFR >90  --   --    GLUCOSE 111* 95 93     Recent Labs     06/23/24  1819 01/25/23  0809 12/20/21  0832   ALBUMIN 4.4 4.3 4.4   ALKPHOS 97 97 62   ALT 32 41 16   AST 30 31 15   BILITOT 0.5 0.6 0.5   LIPASE 31  --   --      CBC:  Recent Labs     06/23/24  1819 01/25/23  0809 12/20/21  0832   WBC 6.5 7.1 5.5   HGB 15.0 14.4 14.9   HCT 44.1 43.5 45.7    277 280   MCV 90 92 95     COAG: No results for input(s): \"INR\", \"DDIMERVTE\" in the last 71413 hours.  ENDO:  Recent Labs     01/25/23  0809 04/06/21  0710 08/21/20  0903 08/20/19  1233 05/02/18  1018   TSH  --  1.55  --   --   --    HGBA1C 6.0*  --  5.8 5.5 5.7      CARDIAC:   Recent Labs     06/23/24  1925 06/23/24  1819   TROPHS <3 3     Recent Labs     01/25/23  0809 02/01/22  0732 12/20/21  0832   CHOL 143 216* 253*   LDLF 77 140* 165*   HDL 54.1 59.2 69.4   TRIG 58 82 95               Assessment/Plan   69-year-old male with HTN, dyslipidemia with recent episode of atypical chest discomfort.  He ruled out for MI, ECG normal and " exercise stress echocardiogram images reviewed by me and are normal.  Above average functional capacity of 10 METS.  Very low probability for any type of flow-limiting coronary disease.  Reassurance provided.  Continue current medications for blood pressure and cholesterol and can follow-up with us in cardiology as needed      Followup Appts:  Future Appointments   Date Time Provider Department Center   8/28/2024 11:00 AM Rusty Wayne DO [id number] [facility]   9/5/2024  9:00 AM Yulisa Gross MD MPH [id number] [facility]           ____________________________________________________________  Rusty Wayne DO  [facility]  [facility]

## 2024-08-28 ENCOUNTER — OFFICE VISIT (OUTPATIENT)
Dept: CARDIOLOGY | Facility: CLINIC | Age: 69
End: 2024-08-28
Payer: MEDICARE

## 2024-08-28 VITALS
WEIGHT: 211 LBS | HEART RATE: 75 BPM | DIASTOLIC BLOOD PRESSURE: 76 MMHG | BODY MASS INDEX: 31.16 KG/M2 | OXYGEN SATURATION: 96 % | SYSTOLIC BLOOD PRESSURE: 143 MMHG

## 2024-08-28 DIAGNOSIS — E78.5 DYSLIPIDEMIA: ICD-10-CM

## 2024-08-28 DIAGNOSIS — R07.89 OTHER CHEST PAIN: Primary | ICD-10-CM

## 2024-08-28 DIAGNOSIS — I10 CHRONIC HYPERTENSION: ICD-10-CM

## 2024-08-28 PROCEDURE — 3078F DIAST BP <80 MM HG: CPT | Performed by: INTERNAL MEDICINE

## 2024-08-28 PROCEDURE — 1159F MED LIST DOCD IN RCRD: CPT | Performed by: INTERNAL MEDICINE

## 2024-08-28 PROCEDURE — 99213 OFFICE O/P EST LOW 20 MIN: CPT | Performed by: INTERNAL MEDICINE

## 2024-08-28 PROCEDURE — 99203 OFFICE O/P NEW LOW 30 MIN: CPT | Performed by: INTERNAL MEDICINE

## 2024-08-28 PROCEDURE — 3077F SYST BP >= 140 MM HG: CPT | Performed by: INTERNAL MEDICINE

## 2024-09-05 ENCOUNTER — LAB (OUTPATIENT)
Dept: LAB | Facility: LAB | Age: 69
End: 2024-09-05
Payer: MEDICARE

## 2024-09-05 ENCOUNTER — APPOINTMENT (OUTPATIENT)
Dept: PRIMARY CARE | Facility: CLINIC | Age: 69
End: 2024-09-05
Payer: MEDICARE

## 2024-09-05 VITALS
HEIGHT: 69 IN | WEIGHT: 216 LBS | BODY MASS INDEX: 31.99 KG/M2 | SYSTOLIC BLOOD PRESSURE: 128 MMHG | OXYGEN SATURATION: 97 % | HEART RATE: 62 BPM | DIASTOLIC BLOOD PRESSURE: 80 MMHG

## 2024-09-05 DIAGNOSIS — R35.0 URINARY FREQUENCY: ICD-10-CM

## 2024-09-05 DIAGNOSIS — H93.12 LEFT-SIDED TINNITUS: ICD-10-CM

## 2024-09-05 DIAGNOSIS — L98.9 SKIN LESION: ICD-10-CM

## 2024-09-05 DIAGNOSIS — Z00.00 ANNUAL PHYSICAL EXAM: ICD-10-CM

## 2024-09-05 DIAGNOSIS — Z12.5 PROSTATE CANCER SCREENING: ICD-10-CM

## 2024-09-05 DIAGNOSIS — Z13.220 NEED FOR LIPID SCREENING: ICD-10-CM

## 2024-09-05 DIAGNOSIS — Z13.220 NEED FOR LIPID SCREENING: Primary | ICD-10-CM

## 2024-09-05 LAB
CHOLEST SERPL-MCNC: 160 MG/DL (ref 0–199)
CHOLESTEROL/HDL RATIO: 2.8
EST. AVERAGE GLUCOSE BLD GHB EST-MCNC: 126 MG/DL
HBA1C MFR BLD: 6 %
HDLC SERPL-MCNC: 56.3 MG/DL
LDLC SERPL CALC-MCNC: 90 MG/DL
NON HDL CHOLESTEROL: 104 MG/DL (ref 0–149)
TRIGL SERPL-MCNC: 70 MG/DL (ref 0–149)
VLDL: 14 MG/DL (ref 0–40)

## 2024-09-05 RX ORDER — TAMSULOSIN HYDROCHLORIDE 0.4 MG/1
0.4 CAPSULE ORAL DAILY
Qty: 90 CAPSULE | Refills: 3 | Status: SHIPPED | OUTPATIENT
Start: 2024-09-05 | End: 2025-09-05

## 2024-09-05 NOTE — PROGRESS NOTES
"Subjective   Patient ID: Eric Alexander is a 69 y.o. male who presents for [facility].  HPI  Concerns today:  Hearing problems - over last 1 month. Ringing in L ear, difficulty hearing in L ear.  Urination - up 2-3 times in the night, this has been for 6 mos. Has also had difficulty maintaining erection  No longer having chest pain    Chronic issues:  HTN - amlodipine and lisinopril   HLD - on atorvastatin  Advair PRN for URIs    Health Maintenance:  Colon cancer screening: Colonoscopy 2019; next due in 5 years, 2024   AAA screenin2022 2.5 cm of AAA   Immunizations: Had zoster vaccines, had Prevnar 13  Labs: CBC, CMP, HgbA1c, PSA, Vit D, lipid panel     Social history:  3 months in [facility], 9 months in [place] with wife single floor  Drives, does finances, groceries, cooking independently  Plays [employer], swims, walks frequently  Has Living Will and HCPOA  Smoking: Quit in , had smoked for 25 yrs, 2 PPD  Alcohol: 3 nights per week, 2-3 drinks per night    Family history:  Mother:  at 82, cholangiocarcinoma; Father:  at 64, AAA;  stomach CA, COPD, MI, Multiple Myeloma; 2 children (dtr, nurse in [employer]; son)     Objective   Visit Vitals  /80   Pulse 62   Ht 1.753 m (5' 9\")   Wt 98 kg (216 lb)   SpO2 97%   BMI 31.90 kg/m²   Smoking Status Former   BSA 2.18 m²      Physical Exam  General: Well appearing, conversational, in no acute distress  HEENT: EOMI, PERRL, nares patent without congestion, MMM, TMs clear bilaterally without erythema or fluid buildup  Chest: 5 cm mobile mass on L upper chest wall, firm, nontender, nonerythematous   CV: RRR, no murmurs  Resp: Lungs CTAB, normal work of breathing  GI: Soft, nondistended, nontender, BS+   Ext: No lower ext swelling  Skin: Warm, dry, no rashes, tan and freckled skin  Neuro: Awake, alert, oriented x3, moving all 4 extremities, nonfocal, normal gait, ambulates without assistance  Psych: Appropriate mood and affect      Assessment/Plan   Eric " ALEKSANDAR Alexander is a 69 y.o. male who presents for Establish Care.  Problem List Items Addressed This Visit    None  Patient is feeling well, no acute concerns and chest pain has resolved. BP is well controlled in office today 128/80. Has changes in hearing of Left ear, will refer to audiology. Having LUTS symptoms and ED, will prescribe tamsulosin and check PSA.    #Hearing loss  -Refer to audiology    #BPH symptoms  -Prescribe tamsulosin for BPH and can help indirectly with erectile dysfunction  -Check PSA    Health Maintenance:  Colon cancer screening: Colonoscopy 2019; next due in 5 years, 2024   - Ordered colonoscopy  AAA screenin2022 2.5 cm of AAA   Immunizations: Had zoster vaccines, had Prevnar 13. Declined flu vaccine today  Labs: CBC, CMP from this year WNL  - Ordered HgbA1c, PSA, lipid panel today  - Recommend dermatology skin check, patient with frequent sun exposure, does not use sun screen. Declined at this time, will recommend at next visit.    RTC in 1 year for annual wellness    Patient was seen and discussed with attending Dr. Ginny Beltran MD  Internal Medicine and Pediatrics, PGY3

## 2024-09-05 NOTE — PATIENT INSTRUCTIONS
Thank you for coming today Eric!    For your left ear ringing, please schedule a visit with audiology. Call [phone] to schedule.     For your increased urinary frequency, please start tamsulosin 0.4mg daily. I am also ordering a PSA lab.    Please schedule your colonoscopy by calling [phone].    Please get your blood work. You can get this at any  lab, the one here is on the lower level in [address].     I will see you in a year or sooner if needed!

## 2024-09-06 ASSESSMENT — ACTIVITIES OF DAILY LIVING (ADL)
TAKING_MEDICATION: INDEPENDENT
GROCERY_SHOPPING: INDEPENDENT
MANAGING_FINANCES: INDEPENDENT
DOING_HOUSEWORK: INDEPENDENT
BATHING: INDEPENDENT
DRESSING: INDEPENDENT

## 2024-09-06 ASSESSMENT — PATIENT HEALTH QUESTIONNAIRE - PHQ9
2. FEELING DOWN, DEPRESSED OR HOPELESS: NOT AT ALL
SUM OF ALL RESPONSES TO PHQ9 QUESTIONS 1 AND 2: 0
1. LITTLE INTEREST OR PLEASURE IN DOING THINGS: NOT AT ALL

## 2024-09-06 ASSESSMENT — ENCOUNTER SYMPTOMS
LOSS OF SENSATION IN FEET: 0
DEPRESSION: 0
OCCASIONAL FEELINGS OF UNSTEADINESS: 0

## 2024-09-07 LAB
PSA FREE MFR SERPL: 32 %
PSA FREE SERPL-MCNC: 1.6 NG/ML
PSA SERPL IA-MCNC: 5 NG/ML (ref 0–4)

## 2024-09-09 DIAGNOSIS — Z12.11 COLON CANCER SCREENING: ICD-10-CM

## 2024-09-09 RX ORDER — POLYETHYLENE GLYCOL 3350, SODIUM SULFATE ANHYDROUS, SODIUM BICARBONATE, SODIUM CHLORIDE, POTASSIUM CHLORIDE 236; 22.74; 6.74; 5.86; 2.97 G/4L; G/4L; G/4L; G/4L; G/4L
POWDER, FOR SOLUTION ORAL
Qty: 4000 ML | Refills: 0 | Status: SHIPPED | OUTPATIENT
Start: 2024-09-09

## 2024-10-01 NOTE — PROGRESS NOTES
AUDIOLOGY ADULT EVALUATION      Name:  Eric Alexander   :  1955  Age:  69 y.o.  Date of Evaluation:  10/3/2024    Time: 09:30-10:00    IMPRESSIONS     Today's testing revealed normal middle ear functioning with normal hearing sensitivity sloping to moderate sensorineural hearing loss in the right ear and mild sloping to moderately-severe sensorineural hearing loss in the left ear. Word understanding was excellent, bilaterally.     Due to asymmetric hearing and asymmetric tinnitus, history of vertigo/falls risk, I will refer Eric to ENT for medical evaluation and treatment. If he wishes to pursue hearing aids, he will need ENT to sign a letter of medical clearance (LMN) for this.     Amplification needs: Hearing aids were discussed as a management option for hearing loss pending medical clearance. A hearing aid is recommended at least for the left ear. Eric was provided with insurance verification worksheet, hearing aid candidacy packet, and a copy of his audiogram.    RECOMMENDATIONS     Continue medical follow up with primary care provider and/or Ears Nose and Throat (ENT) provider as recommended.  Return for audiologic evaluation annually to monitor hearing sensitivity and assess middle ear status or sooner should concerns arise.   Consider amplification pending medical clearance. Check insurance benefit for hearing aid benefits and in-network providers. To schedule a hearing aid consultation with [facility] audiology department (out of pocket), call [phone]. Patient was provided with insurance verification worksheet, hearing aid candidacy packet, and a copy of today's audiogram.  Avoid exposure to loud sounds by moving away from the noise, turning down the volume, or wearing proper hearing protection correctly.  Consider use of tinnitus management options, which include but are not limited to the following: sound therapy (use of pleasant or calming sounds that diminish the presence of  tinnitus); mindfulness, meditation, and breathing exercises; sleep hygiene; mental health evaluation and formal therapies; psychiatric management of psychopharmaceuticals; dental/orthodontia evaluation; dietary changes (reduction of sodium, caffeine, alcohol); lifestyle changes (exercise, etc.); use of hearing protection and avoidance of loud noise; and formal tinnitus therapies (Tinnitus Retraining Therapy/ TRT, Progressive Tinnitus Management, Tinnitus Activities Treatment, Cognitive Behavioral Therapy).  Appropriate communication techniques (face-to-face at a 4-6 foot maximum distance, reduced background noise, speech at normal volume and slower rate, good lighting, etc).     HISTORY     Mr. Alexander, 69 y.o., was seen today for an initial audiologic evaluation at the request of Yulisa Gross MD MPH, due to complaints of gradual decrease in hearing and tinnitus, bilaterally.     History obtained from patient report and chart review.     Change in Hearing:   Yes, in the left ear greater than the right ear. Has noticed it over the past year.   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  Left Ear: Ear canal clear, tympanic membrane visualized.    Tympanometry (226 Hz): This test is an objective  evaluation of middle ear function. CPT code: 50108   Right Ear: Type A, middle ear pressure and tympanic membrane compliance within normal limits.   Left Ear: Type A, middle ear pressure and tympanic membrane compliance within normal limits.     Acoustic Reflexes: This test is an objective measure of auditory and facial nerve pathways.   (Probe) Right Ear (ipsi right stimulus ear; contralateral left stimulus ear): (Ipsilateral) Responses present at 500-1000 Hz, and absent at 8531-8039 Hz.  (Probe) Left Ear (ipsi left stimulus ear; contralateral right stimulus ear):  (Ipsilateral) Responses present at 500 Hz, and absent at 9824-1814 Hz.    Distortion Product Otoacoustic Emissions (DPOAE): This test is a measurement of responses which are generated by the cochlea when it is simultaneously stimulated by two pure tone frequencies. CPT code: 62863   Right Ear: Did not test.  Left Ear:  Did not test.  Present OAEs suggest normal or near cochlear outer hair cell function for corresponding frequency region(s). Absent OAEs with normal middle ear function can be consistent with some degree of hearing loss. Assessment of cochlear outer hair cell function may be impacted by outer or middle ear function.    Test technique: Conventional Audiometry via headphones. This test is an evaluation hearing sensitivity via air and bone conduction and speech recognition testing. CPT code: 40624  Reliability:  good    Pure Tone Audiometry:     Right Ear: Hearing sensitivity within normal limits for 125-4000 Hz, sloping to moderate likely sensorineural hearing loss 3178-1708 Hz.  Left Ear: Mild to moderately-severe sensorineural hearing loss.   NOTE - asymmetries 125-8000 Hz, left ear being worse.     Speech Audiometry:   Right Ear: Speech Reception Threshold (SRT) was obtained at 20 dB HL. This is in good agreement with three frequency Pure Tone Average.   Word Recognition scores were excellent (96%) in quiet when words were presented at 60  dB HL. These results are based on [facility] Auditory Test No.6 (NU-6) Ordered by difficulty (N=10).  Left Ear:  Speech Reception Threshold (SRT) was obtained at 25 dB HL. This is in good agreement with three frequency Pure Tone Average.   Word Recognition scores were excellent (100%) in quiet when words were presented at 70 dB HL. These results are based on [facility] Auditory Test No.6 (NU-6) Ordered by difficulty (N=10).     Comparison of today's results with previous test results: No previous results available.      MARIE Bates, CCC-A  Licensed Clinical Audiologist    Degree of   Hearing Sensitivity dB Range   Within Normal Limits (WNL) 0 - 20   Slight 25   Mild 26 - 40   Moderate 41 - 55   Moderately-Severe 56 - 70   Severe 71 - 90   Profound 91 +     Key   CHL Conductive Hearing Loss   ECV Ear Canal Volume   HA Hearing Aid   NIHL Noise-Induced Hearing Loss   PTA Pure Tone Average   SNHL Sensorineural Hearing Loss   TM Tympanic Membrane   WNL Within Normal Limits

## 2024-10-03 ENCOUNTER — TELEPHONE (OUTPATIENT)
Dept: OTOLARYNGOLOGY | Facility: HOSPITAL | Age: 69
End: 2024-10-03

## 2024-10-03 ENCOUNTER — CLINICAL SUPPORT (OUTPATIENT)
Dept: AUDIOLOGY | Facility: CLINIC | Age: 69
End: 2024-10-03
Payer: MEDICARE

## 2024-10-03 DIAGNOSIS — H93.13 TINNITUS OF BOTH EARS: ICD-10-CM

## 2024-10-03 DIAGNOSIS — H90.3 ASYMMETRIC SNHL (SENSORINEURAL HEARING LOSS): Primary | ICD-10-CM

## 2024-10-03 PROCEDURE — 92557 COMPREHENSIVE HEARING TEST: CPT

## 2024-10-03 PROCEDURE — 92550 TYMPANOMETRY & REFLEX THRESH: CPT

## 2024-10-03 NOTE — Clinical Note
Hello! I wish to refer this patient a NPV with ENT, any provider, any availability, due to asymmetric hearing loss, asymmetric tinnitus and issues with vertigo/falls risk.   Thank you!

## 2024-10-22 DIAGNOSIS — R97.20 ELEVATED PSA: Primary | ICD-10-CM

## 2024-11-01 ENCOUNTER — APPOINTMENT (OUTPATIENT)
Dept: OTOLARYNGOLOGY | Facility: CLINIC | Age: 69
End: 2024-11-01
Payer: MEDICARE

## 2024-11-01 VITALS — HEIGHT: 70 IN | WEIGHT: 221 LBS | BODY MASS INDEX: 31.64 KG/M2 | TEMPERATURE: 97.5 F

## 2024-11-01 DIAGNOSIS — H91.8X3 ASYMMETRICAL HEARING LOSS: ICD-10-CM

## 2024-11-01 DIAGNOSIS — H93.13 TINNITUS OF BOTH EARS: ICD-10-CM

## 2024-11-01 DIAGNOSIS — R42 VERTIGO: ICD-10-CM

## 2024-11-01 DIAGNOSIS — H90.3 SENSORINEURAL HEARING LOSS (SNHL) OF BOTH EARS: Primary | ICD-10-CM

## 2024-11-01 PROCEDURE — 3008F BODY MASS INDEX DOCD: CPT | Performed by: NURSE PRACTITIONER

## 2024-11-01 PROCEDURE — 1159F MED LIST DOCD IN RCRD: CPT | Performed by: NURSE PRACTITIONER

## 2024-11-01 PROCEDURE — 1036F TOBACCO NON-USER: CPT | Performed by: NURSE PRACTITIONER

## 2024-11-01 PROCEDURE — 1160F RVW MEDS BY RX/DR IN RCRD: CPT | Performed by: NURSE PRACTITIONER

## 2024-11-01 PROCEDURE — 99203 OFFICE O/P NEW LOW 30 MIN: CPT | Performed by: NURSE PRACTITIONER

## 2024-11-01 ASSESSMENT — PATIENT HEALTH QUESTIONNAIRE - PHQ9
SUM OF ALL RESPONSES TO PHQ9 QUESTIONS 1 AND 2: 0
1. LITTLE INTEREST OR PLEASURE IN DOING THINGS: NOT AT ALL
2. FEELING DOWN, DEPRESSED OR HOPELESS: NOT AT ALL

## 2024-11-03 ENCOUNTER — HOSPITAL ENCOUNTER (OUTPATIENT)
Dept: RADIOLOGY | Facility: HOSPITAL | Age: 69
Discharge: HOME | End: 2024-11-03
Payer: MEDICARE

## 2024-11-03 DIAGNOSIS — R97.20 ELEVATED PSA: ICD-10-CM

## 2024-11-03 PROCEDURE — 72197 MRI PELVIS W/O & W/DYE: CPT | Performed by: RADIOLOGY

## 2024-11-03 PROCEDURE — 72197 MRI PELVIS W/O & W/DYE: CPT

## 2024-11-03 PROCEDURE — 2550000001 HC RX 255 CONTRASTS: Performed by: STUDENT IN AN ORGANIZED HEALTH CARE EDUCATION/TRAINING PROGRAM

## 2024-11-03 PROCEDURE — A9575 INJ GADOTERATE MEGLUMI 0.1ML: HCPCS | Performed by: STUDENT IN AN ORGANIZED HEALTH CARE EDUCATION/TRAINING PROGRAM

## 2024-11-03 RX ORDER — GADOTERATE MEGLUMINE 376.9 MG/ML
19 INJECTION INTRAVENOUS
Status: COMPLETED | OUTPATIENT
Start: 2024-11-03 | End: 2024-11-03

## 2024-12-16 DIAGNOSIS — I10 PRIMARY HYPERTENSION: ICD-10-CM

## 2024-12-16 DIAGNOSIS — R73.9 HYPERGLYCEMIA, UNSPECIFIED: ICD-10-CM

## 2024-12-16 RX ORDER — ATORVASTATIN CALCIUM 20 MG/1
20 TABLET, FILM COATED ORAL DAILY
Qty: 90 TABLET | Refills: 1 | Status: SHIPPED | OUTPATIENT
Start: 2024-12-16

## 2024-12-16 RX ORDER — LISINOPRIL 10 MG/1
10 TABLET ORAL DAILY
Qty: 90 TABLET | Refills: 1 | Status: SHIPPED | OUTPATIENT
Start: 2024-12-16

## 2024-12-16 RX ORDER — AMLODIPINE BESYLATE 10 MG/1
10 TABLET ORAL DAILY
Qty: 90 TABLET | Refills: 1 | Status: SHIPPED | OUTPATIENT
Start: 2024-12-16

## 2024-12-20 ENCOUNTER — APPOINTMENT (OUTPATIENT)
Dept: GASTROENTEROLOGY | Facility: EXTERNAL LOCATION | Age: 69
End: 2024-12-20
Payer: MEDICARE

## 2024-12-20 DIAGNOSIS — D12.4 BENIGN NEOPLASM OF DESCENDING COLON: ICD-10-CM

## 2024-12-20 DIAGNOSIS — D12.3 BENIGN NEOPLASM OF TRANSVERSE COLON: ICD-10-CM

## 2024-12-20 DIAGNOSIS — Z00.00 ANNUAL PHYSICAL EXAM: ICD-10-CM

## 2024-12-20 DIAGNOSIS — Z86.0100 HX OF COLONIC POLYPS: ICD-10-CM

## 2024-12-20 DIAGNOSIS — Z12.11 SPECIAL SCREENING FOR MALIGNANT NEOPLASMS, COLON: Primary | ICD-10-CM

## 2024-12-20 PROCEDURE — 45380 COLONOSCOPY AND BIOPSY: CPT | Performed by: INTERNAL MEDICINE

## 2024-12-20 PROCEDURE — 45385 COLONOSCOPY W/LESION REMOVAL: CPT | Performed by: INTERNAL MEDICINE

## 2024-12-23 ENCOUNTER — LAB REQUISITION (OUTPATIENT)
Dept: LAB | Facility: HOSPITAL | Age: 69
End: 2024-12-23
Payer: MEDICARE

## 2024-12-23 DIAGNOSIS — Z12.11 ENCOUNTER FOR SCREENING FOR MALIGNANT NEOPLASM OF COLON: ICD-10-CM

## 2025-01-02 LAB
LABORATORY COMMENT REPORT: NORMAL
PATH REPORT.FINAL DX SPEC: NORMAL
PATH REPORT.GROSS SPEC: NORMAL
PATH REPORT.RELEVANT HX SPEC: NORMAL
PATH REPORT.TOTAL CANCER: NORMAL

## 2025-02-21 ENCOUNTER — OFFICE VISIT (OUTPATIENT)
Dept: UROLOGY | Facility: HOSPITAL | Age: 70
End: 2025-02-21
Payer: MEDICARE

## 2025-02-21 DIAGNOSIS — R97.20 ELEVATED PSA: ICD-10-CM

## 2025-02-21 DIAGNOSIS — N52.9 ERECTILE DYSFUNCTION, UNSPECIFIED ERECTILE DYSFUNCTION TYPE: ICD-10-CM

## 2025-02-21 PROCEDURE — 1159F MED LIST DOCD IN RCRD: CPT | Performed by: UROLOGY

## 2025-02-21 PROCEDURE — 51798 US URINE CAPACITY MEASURE: CPT | Performed by: UROLOGY

## 2025-02-21 PROCEDURE — 1036F TOBACCO NON-USER: CPT | Performed by: UROLOGY

## 2025-02-21 PROCEDURE — 99204 OFFICE O/P NEW MOD 45 MIN: CPT | Performed by: UROLOGY

## 2025-02-21 PROCEDURE — G2211 COMPLEX E/M VISIT ADD ON: HCPCS | Performed by: UROLOGY

## 2025-02-21 PROCEDURE — 99214 OFFICE O/P EST MOD 30 MIN: CPT | Mod: 25 | Performed by: UROLOGY

## 2025-02-21 RX ORDER — SILDENAFIL 100 MG/1
100 TABLET, FILM COATED ORAL AS NEEDED
Qty: 30 TABLET | Refills: 3 | Status: SHIPPED | OUTPATIENT
Start: 2025-02-21 | End: 2025-03-23

## 2025-02-21 ASSESSMENT — PATIENT HEALTH QUESTIONNAIRE - PHQ9
SUM OF ALL RESPONSES TO PHQ9 QUESTIONS 1 AND 2: 0
2. FEELING DOWN, DEPRESSED OR HOPELESS: NOT AT ALL
1. LITTLE INTEREST OR PLEASURE IN DOING THINGS: NOT AT ALL

## 2025-02-21 NOTE — PROGRESS NOTES
NPV     HISTORY OF PRESENT ILLNESS:   Eric Alexander is a 69 y.o. male who is being seen today for elevated PSA (no fhx of prostate CA)  - PI-RADS 2 on prostate MRI 11/3/24    Lab Results   Component Value Date    PSA 5.0 (H) 09/05/2024    PSA 4.1 (H) 01/25/2023    PSA <0.1 12/20/2021    PSA 1.0 08/21/2020       PAST MEDICAL HISTORY:  Past Medical History:   Diagnosis Date    Allergic rhinitis, unspecified 12/29/2015    Allergic rhinitis    Benign lipomatous neoplasm, unspecified 05/26/2015    Lipoma    Cough variant asthma (HHS-HCC) 05/26/2015    Cough variant asthma    Elevated prostate specific antigen (PSA) 08/21/2020    Elevated prostate specific antigen (PSA)    Hyperlipidemia, unspecified 12/21/2022    Hyperlipemia    Personal history of other endocrine, nutritional and metabolic disease 05/26/2015    History of obesity    Unspecified thoracic, thoracolumbar and lumbosacral intervertebral disc disorder     Lumbar disc disease       PAST SURGICAL HISTORY:  Past Surgical History:   Procedure Laterality Date    CATARACT EXTRACTION  05/19/2014    Cataract Surgery    CATARACT EXTRACTION  05/02/2018    Cataract Extraction    MR HEAD ANGIO WO IV CONTRAST  4/5/2021    MR HEAD ANGIO WO IV CONTRAST 4/5/2021 [facility]    MR NECK ANGIO WO IV CONTRAST  4/5/2021    MR NECK ANGIO WO IV CONTRAST 4/5/2021 [facility]    SPINAL FUSION  05/02/2018    Spinal Arthrodesis    TONSILLECTOMY  05/02/2018    Tonsillectomy        ALLERGIES:   No Known Allergies     MEDICATIONS:   Current Outpatient Medications   Medication Instructions    amLODIPine (NORVASC) 10 mg, oral, Daily    atorvastatin (LIPITOR) 20 mg, oral, Daily    fluticasone propion-salmeteroL (Advair Diskus) 500-50 mcg/dose diskus inhaler Take 1 puff twice a day until cough gone. Rinse mouth with water after use to reduce aftertaste and incidence of candidiasis. Do not swallow.    lisinopril 10 mg, oral, Daily    polyethylene glycol (GoLYTELY)  "236-22.74-6.74 -5.86 gram solution Drink 1/2 starting at 6 pm the night before your procedure then drink the 2nd 1/2 5 hours before procedure arrival time    tamsulosin (FLOMAX) 0.4 mg, oral, Daily    triamcinolone (Nasacort) 55 mcg nasal inhaler 2 sprays, As needed        PHYSICAL EXAM:  There were no vitals taken for this visit.  Constitutional: Patient appears well-developed and well-nourished. No distress.    Pulmonary/Chest: Effort normal. No respiratory distress.   Abdominal: Soft, ND NT  : WNL  Musculoskeletal: Normal range of motion.    Neurological: Alert and oriented to person, place, and time.  Psychiatric: Normal mood and affect. Behavior is normal. Thought content normal.      Labs:  No results found for: \"TESTOSTERONE\"  Lab Results   Component Value Date    PSA 5.0 (H) 09/05/2024    PSA 4.1 (H) 01/25/2023    PSA <0.1 12/20/2021    PSA 1.0 08/21/2020     No components found for: \"CBC\"  Lab Results   Component Value Date    CREATININE 0.81 06/23/2024     No components found for: \"TESTOTMS\"  No results found for: \"TESTF\"    PVR:  AUA:   TOPHER:    Imaging: Prostate MRI 11/3/24: BPH changes of the transition zone ( PI-RADS 2).    Discussion: Reviewed patient's records. Patient reassured. No fhx of prostate CA. No urinary complaints at this time. Denies hematuria, dysuria, nocturia, flank pain, and bothersome frequency or urgency. Denies pushing or straining to void and states he feels he empties his bladder when voiding. He is on Tamsulosin which has significantly improved his sx. Has occasional urgency but not bothersome. He needs help with erections. Tried Cialis 20 years ago but it gave him headaches. Will have him try Viagra 100mg prn 2 hours prior to sexual activity. Rx sent.   Will check PSA in 3 and 6 months with a follow up in 6 months to discuss the trend.        Assessment:      1. Elevated PSA  Referral to Urology    Measure post void residual             Plan:   Rx Viagra 100mg prn 2 hours " prior to sexual activity  PSA PANEL in 3 and 6 months with a fu in 6 months to discuss the trend.   All questions and concerns were addressed. Patient verbalizes understanding and has no other questions at this time.     Scribe Attestation  By signing my name below, I, Kris Christine   attest that this documentation has been prepared under the direction and in the presence of Gus Casiano MD.

## 2025-05-16 DIAGNOSIS — R97.20 ELEVATED PSA: ICD-10-CM

## 2025-05-22 LAB
PSA FREE MFR SERPL: 17 % (CALC)
PSA FREE SERPL-MCNC: 1.6 NG/ML
PSA SERPL-MCNC: 9.3 NG/ML

## 2025-08-08 DIAGNOSIS — R97.20 ELEVATED PSA: ICD-10-CM

## 2025-08-19 LAB
PSA FREE MFR SERPL: 16 % (CALC)
PSA FREE SERPL-MCNC: 1.4 NG/ML
PSA SERPL-MCNC: 8.5 NG/ML

## 2025-08-22 ENCOUNTER — OFFICE VISIT (OUTPATIENT)
Dept: UROLOGY | Facility: HOSPITAL | Age: 70
End: 2025-08-22
Payer: MEDICARE

## 2025-08-22 DIAGNOSIS — R39.9 LOWER URINARY TRACT SYMPTOMS (LUTS): ICD-10-CM

## 2025-08-22 DIAGNOSIS — R97.20 ELEVATED PSA: Primary | ICD-10-CM

## 2025-08-22 PROCEDURE — 99212 OFFICE O/P EST SF 10 MIN: CPT

## 2025-08-22 PROCEDURE — 51798 US URINE CAPACITY MEASURE: CPT | Performed by: UROLOGY

## 2025-08-22 PROCEDURE — 99214 OFFICE O/P EST MOD 30 MIN: CPT | Performed by: UROLOGY

## 2025-08-22 PROCEDURE — G2211 COMPLEX E/M VISIT ADD ON: HCPCS | Performed by: UROLOGY

## 2025-08-22 PROCEDURE — 1159F MED LIST DOCD IN RCRD: CPT | Performed by: UROLOGY

## 2025-08-22 RX ORDER — TADALAFIL 5 MG/1
TABLET ORAL
Qty: 90 TABLET | Refills: 3 | Status: SHIPPED | OUTPATIENT
Start: 2025-08-22

## 2025-08-25 ENCOUNTER — APPOINTMENT (OUTPATIENT)
Dept: PRIMARY CARE | Facility: CLINIC | Age: 70
End: 2025-08-25
Payer: MEDICARE

## 2025-08-26 ENCOUNTER — APPOINTMENT (OUTPATIENT)
Dept: PRIMARY CARE | Facility: CLINIC | Age: 70
End: 2025-08-26
Payer: MEDICARE

## 2025-09-08 ENCOUNTER — APPOINTMENT (OUTPATIENT)
Dept: PRIMARY CARE | Facility: CLINIC | Age: 70
End: 2025-09-08
Payer: MEDICARE